# Patient Record
Sex: FEMALE | URBAN - METROPOLITAN AREA
[De-identification: names, ages, dates, MRNs, and addresses within clinical notes are randomized per-mention and may not be internally consistent; named-entity substitution may affect disease eponyms.]

---

## 2020-02-04 ENCOUNTER — APPOINTMENT (RX ONLY)
Dept: URBAN - METROPOLITAN AREA CLINIC 152 | Facility: CLINIC | Age: 75
Setting detail: DERMATOLOGY
End: 2020-02-04

## 2020-02-04 DIAGNOSIS — L738 OTHER SPECIFIED DISEASES OF HAIR AND HAIR FOLLICLES: ICD-10-CM

## 2020-02-04 DIAGNOSIS — L81.0 POSTINFLAMMATORY HYPERPIGMENTATION: ICD-10-CM

## 2020-02-04 DIAGNOSIS — L73.9 FOLLICULAR DISORDER, UNSPECIFIED: ICD-10-CM

## 2020-02-04 DIAGNOSIS — L663 OTHER SPECIFIED DISEASES OF HAIR AND HAIR FOLLICLES: ICD-10-CM

## 2020-02-04 PROBLEM — L02.92 FURUNCLE, UNSPECIFIED: Status: ACTIVE | Noted: 2020-02-04

## 2020-02-04 PROCEDURE — ? COUNSELING

## 2020-02-04 PROCEDURE — ? DIAGNOSIS COMMENT

## 2020-02-04 PROCEDURE — 99203 OFFICE O/P NEW LOW 30 MIN: CPT

## 2020-02-04 NOTE — HPI: EVALUATION OF SKIN LESION(S)
What Type Of Note Output Would You Prefer (Optional)?: Bullet Format
Hpi Title: Evaluation of Skin Lesions
How Severe Are Your Spot(S)?: mild
Have Your Spot(S) Been Treated In The Past?: has been treated
Additional History: 4.8.2019 bariatric surgery. Pt notes no change with medicines prescribed.

## 2020-03-09 ENCOUNTER — APPOINTMENT (RX ONLY)
Dept: URBAN - METROPOLITAN AREA CLINIC 152 | Facility: CLINIC | Age: 75
Setting detail: DERMATOLOGY
End: 2020-03-09

## 2020-03-09 DIAGNOSIS — B88.0 OTHER ACARIASIS: ICD-10-CM | Status: IMPROVED

## 2020-03-09 PROCEDURE — ? PRESCRIPTION

## 2020-03-09 PROCEDURE — 99213 OFFICE O/P EST LOW 20 MIN: CPT

## 2020-03-09 PROCEDURE — ? COUNSELING

## 2020-03-09 PROCEDURE — ? DIAGNOSIS COMMENT

## 2020-03-09 PROCEDURE — ? TREATMENT REGIMEN

## 2020-03-09 RX ORDER — HYDROQUINONE 4 %
CREAM (GRAM) TOPICAL
Qty: 1 | Refills: 2 | Status: ERX | COMMUNITY
Start: 2020-03-09

## 2020-03-09 RX ADMIN — Medication: at 00:00

## 2020-03-09 ASSESSMENT — LOCATION SIMPLE DESCRIPTION DERM
LOCATION SIMPLE: LEFT CHEEK
LOCATION SIMPLE: RIGHT CHEEK
LOCATION SIMPLE: UPPER LIP
LOCATION SIMPLE: LEFT LIP

## 2020-03-09 ASSESSMENT — LOCATION DETAILED DESCRIPTION DERM
LOCATION DETAILED: LEFT MEDIAL BUCCAL CHEEK
LOCATION DETAILED: LEFT LOWER CUTANEOUS LIP
LOCATION DETAILED: RIGHT MEDIAL BUCCAL CHEEK
LOCATION DETAILED: PHILTRUM

## 2020-03-09 ASSESSMENT — LOCATION ZONE DERM
LOCATION ZONE: FACE
LOCATION ZONE: LIP

## 2020-03-09 NOTE — PROCEDURE: COUNSELING
Patient Specific Counseling (Will Not Stick From Patient To Patient): - Discussed previous treatment with Soolantra nightly x2.5 weeks\\n- Discussed treating with PIH with hydroquinone 4% cream QD x1 week, then BID, went over regimen. Advised testing on arm first \\n- Discussed good sunscreen habits to prevent further darkening of spots \\n- Advised continued treatment with Soolantra nightly
Detail Level: Simple

## 2020-03-09 NOTE — PROCEDURE: TREATMENT REGIMEN
Detail Level: Zone
Initiate Treatment: Hydroquinone 4% cream QD x1 week, then BID
Continue Regimen: Soolantra nightly
Otc Regimen: Neutrogena sunscreen QD

## 2020-04-29 ENCOUNTER — RX ONLY (OUTPATIENT)
Age: 75
Setting detail: RX ONLY
End: 2020-04-29

## 2020-04-29 ENCOUNTER — APPOINTMENT (RX ONLY)
Dept: URBAN - METROPOLITAN AREA CLINIC 152 | Facility: CLINIC | Age: 75
Setting detail: DERMATOLOGY
End: 2020-04-29

## 2020-04-29 DIAGNOSIS — B88.0 OTHER ACARIASIS: ICD-10-CM

## 2020-04-29 PROCEDURE — 99212 OFFICE O/P EST SF 10 MIN: CPT

## 2020-04-29 PROCEDURE — ? TELEPHONE E/M SERVICE

## 2020-04-29 PROCEDURE — ? DIAGNOSIS COMMENT

## 2020-04-29 NOTE — PROCEDURE: TELEPHONE E/M SERVICE
Technology Used: telephone
Billing Statement: Telephone evaluation and management service by a physician or other qualified health care professional who may report evaluation and management services provided to an established patient, parent, or guardian not originating from a related E/M services provided within the previous 7 days not leading to an E/M service or procedure within the next 24 hours or soonest available appointment.The call must be initiated by the patient. These can only be reported by providers licensed to render E/M services. The visit can't be related to an E/M service provided in the last 7 days. The visit can't trigger a face-to-face visit within 24 hours or the soonest available appointment.
Comments: s/p bariatric surgery, sleeve April 2019. This didnt start until after that. 1 year later, still only able to eat\\n3 ou throughout the day. Improved with JUST Soolantra topically for a few weeks - notes bumps resolved and itchiness completely went away. She notes after her visit March 9th, it started to come back, now with MORE pustules, ALL over her face, very itchy. Ran out of Soolantra one week ago, flare happened before running out. Denies any systemic symptoms like fevers or chills or rash elsewhere, no joint pain. Patient notes pustules throughout her entire face with some PIH as well.\\nDdx still includes Demodex folliculitis (that needed more than Soolantra) vs. new onset Rosacea (vs\\nless likely pityrosporum folliculitis). Rec starting antiinflammatory anbx doxy - but will only do once daily since on warfarin (see below) and not able to eat much food (GI issues). Also rec starting BP wash. \\nOf note, pt does not have access to a camera phone or computer and is quarantined at home by\\nherself. On waffarin - discussed need to monitor this while on doxy - she will check with her PCP if its OK to take this.\\n\\nShe will touch base in 2 weeks - if still flaring, will try getting her a full script of SOolantra and consider\\nIvermectin vs. PF tx.
Detail Level: Simple

## 2020-05-13 ENCOUNTER — RX ONLY (OUTPATIENT)
Age: 75
Setting detail: RX ONLY
End: 2020-05-13

## 2020-05-13 RX ORDER — IVERMECTIN 10 MG/G
CREAM TOPICAL
Qty: 1 | Refills: 10 | Status: ERX | COMMUNITY
Start: 2020-05-13

## 2024-01-11 ENCOUNTER — APPOINTMENT (RX ONLY)
Dept: URBAN - METROPOLITAN AREA CLINIC 151 | Facility: CLINIC | Age: 79
Setting detail: DERMATOLOGY
End: 2024-01-11

## 2024-01-11 DIAGNOSIS — L03.03 CELLULITIS OF TOE: ICD-10-CM

## 2024-01-11 DIAGNOSIS — L28.0 LICHEN SIMPLEX CHRONICUS: ICD-10-CM

## 2024-01-11 DIAGNOSIS — B35.1 TINEA UNGUIUM: ICD-10-CM

## 2024-01-11 PROBLEM — L03.031 CELLULITIS OF RIGHT TOE: Status: ACTIVE | Noted: 2024-01-11

## 2024-01-11 PROCEDURE — ? DIAGNOSIS COMMENT

## 2024-01-11 PROCEDURE — 99204 OFFICE O/P NEW MOD 45 MIN: CPT

## 2024-01-11 PROCEDURE — ? PRESCRIPTION

## 2024-01-11 PROCEDURE — ? PRESCRIPTION MEDICATION MANAGEMENT

## 2024-01-11 PROCEDURE — ? COUNSELING

## 2024-01-11 RX ORDER — TRIAMCINOLONE ACETONIDE 1 MG/G
OINTMENT TOPICAL
Qty: 80 | Refills: 1 | Status: ERX | COMMUNITY
Start: 2024-01-11

## 2024-01-11 RX ORDER — MUPIROCIN 20 MG/G
OINTMENT TOPICAL
Qty: 22 | Refills: 1 | Status: ERX | COMMUNITY
Start: 2024-01-11

## 2024-01-11 RX ADMIN — MUPIROCIN: 20 OINTMENT TOPICAL at 00:00

## 2024-01-11 RX ADMIN — TRIAMCINOLONE ACETONIDE: 1 OINTMENT TOPICAL at 00:00

## 2024-01-11 ASSESSMENT — LOCATION DETAILED DESCRIPTION DERM
LOCATION DETAILED: RIGHT GREAT TOENAIL
LOCATION DETAILED: RIGHT MEDIAL 4TH TOE
LOCATION DETAILED: LEFT ELBOW

## 2024-01-11 ASSESSMENT — LOCATION SIMPLE DESCRIPTION DERM
LOCATION SIMPLE: RIGHT 4TH TOE
LOCATION SIMPLE: LEFT ELBOW
LOCATION SIMPLE: RIGHT GREAT TOE

## 2024-01-11 ASSESSMENT — LOCATION ZONE DERM
LOCATION ZONE: TOE
LOCATION ZONE: ARM
LOCATION ZONE: TOENAIL

## 2024-01-11 NOTE — HPI: OTHER
Condition:: Dry skin
Please Describe Your Condition:: Patient present for evaluation and management of dry, discolored skin located on the left elbow.

## 2024-01-11 NOTE — HPI: NAIL DYSTROPHY
Is This A New Presentation, Or A Follow-Up?: Nail Dystrophy
Additional History: Patient has been doing at-home vinagar and salt soaks.

## 2024-01-11 NOTE — PROCEDURE: PRESCRIPTION MEDICATION MANAGEMENT
Initiate Treatment: -mupirocin 2 % topical ointment Sig: Apply to the affected areas of the left foot twice a daily for 2-weeks.\\n-triamcinolone acetonide 0.1 % topical ointment Sig: Apply to affected areas of the left foot for 1-week twice daily, break for 1-week, and repeat if needed.
Detail Level: Zone
Render In Strict Bullet Format?: No
Initiate Treatment: TAC twice a day for 1-week, break for 1-week, repeat if needed.

## 2024-01-11 NOTE — PROCEDURE: DIAGNOSIS COMMENT
Detail Level: Detailed
Comment: Tender to touch located on the fourth toe. C/w chronic paronychia. Nature of condition discussed. Will initiate mupirocin ointment and TAC 0.1% ointment. SE’s of prolonged topical steroid use explained. Toe nail fungus present upon evaluation. Onset for several years. Treat,ent options discussed.  Encouraged patient to maintain nails short~ length of toenails may be causing paronychia. Oral lamisil not advised due to patient medical history. May continue w/ vinegar soak. Consultation w/ podiatrist advised for further management. Written instructions provided to patient. F/U 3-months if needed.
Render Risk Assessment In Note?: no
Comment: C/w LSC. Nature/etiology disc used in detail. Patient used affected area of the left elbow as support when sitting/standing to and from hospital bed. Encouraged daily moisturizing ~ [place] ok to continue using. Will initiate TAC 0.1% oint twice daily w/ 1-week breaks in between. F/u 3-months if needed.

## 2025-08-01 ENCOUNTER — APPOINTMENT (OUTPATIENT)
Dept: URBAN - METROPOLITAN AREA CLINIC 152 | Facility: CLINIC | Age: 80
Setting detail: DERMATOLOGY
End: 2025-08-01

## 2025-08-01 DIAGNOSIS — L82.0 INFLAMED SEBORRHEIC KERATOSIS: ICD-10-CM

## 2025-08-01 DIAGNOSIS — L20.89 OTHER ATOPIC DERMATITIS: ICD-10-CM | Status: INADEQUATELY CONTROLLED

## 2025-08-01 PROCEDURE — ? GENTLE SKIN CARE INSTRUCTIONS

## 2025-08-01 PROCEDURE — ? BENIGN DESTRUCTION

## 2025-08-01 PROCEDURE — ? DIAGNOSIS COMMENT

## 2025-08-01 PROCEDURE — ? PRESCRIPTION

## 2025-08-01 PROCEDURE — ? COUNSELING

## 2025-08-01 PROCEDURE — ? PRESCRIPTION MEDICATION MANAGEMENT

## 2025-08-01 RX ORDER — TRIAMCINOLONE ACETONIDE 1 MG/G
CREAM TOPICAL BID
Qty: 80 | Refills: 2 | Status: ERX | COMMUNITY
Start: 2025-08-01

## 2025-08-01 RX ADMIN — TRIAMCINOLONE ACETONIDE: 1 CREAM TOPICAL at 00:00

## 2025-08-01 ASSESSMENT — LOCATION ZONE DERM: LOCATION ZONE: FACE

## 2025-08-01 ASSESSMENT — LOCATION SIMPLE DESCRIPTION DERM
LOCATION SIMPLE: LEFT EYEBROW
LOCATION SIMPLE: LEFT TEMPLE

## 2025-08-01 ASSESSMENT — LOCATION DETAILED DESCRIPTION DERM
LOCATION DETAILED: LEFT LATERAL EYEBROW
LOCATION DETAILED: LEFT MID TEMPLE